# Patient Record
Sex: FEMALE | Race: BLACK OR AFRICAN AMERICAN | Employment: UNEMPLOYED | URBAN - METROPOLITAN AREA
[De-identification: names, ages, dates, MRNs, and addresses within clinical notes are randomized per-mention and may not be internally consistent; named-entity substitution may affect disease eponyms.]

---
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
## 2019-07-15 ENCOUNTER — OFFICE VISIT (OUTPATIENT)
Dept: PRIMARY CARE CLINIC | Age: 19
End: 2019-07-15
Payer: COMMERCIAL

## 2019-07-15 VITALS
DIASTOLIC BLOOD PRESSURE: 84 MMHG | WEIGHT: 184.1 LBS | TEMPERATURE: 98.1 F | OXYGEN SATURATION: 98 % | HEART RATE: 74 BPM | BODY MASS INDEX: 32.62 KG/M2 | SYSTOLIC BLOOD PRESSURE: 128 MMHG | HEIGHT: 63 IN | RESPIRATION RATE: 12 BRPM

## 2019-07-15 DIAGNOSIS — H04.203 WATERY EYES: ICD-10-CM

## 2019-07-15 DIAGNOSIS — Z00.00 PREVENTATIVE HEALTH CARE: Primary | ICD-10-CM

## 2019-07-15 DIAGNOSIS — Z23 NEED FOR PROPHYLACTIC VACCINATION AGAINST STREPTOCOCCUS PNEUMONIAE (PNEUMOCOCCUS): ICD-10-CM

## 2019-07-15 DIAGNOSIS — K21.9 GASTROESOPHAGEAL REFLUX DISEASE WITHOUT ESOPHAGITIS: ICD-10-CM

## 2019-07-15 DIAGNOSIS — F32.A DEPRESSION, UNSPECIFIED DEPRESSION TYPE: ICD-10-CM

## 2019-07-15 DIAGNOSIS — L65.9 HAIR LOSS: ICD-10-CM

## 2019-07-15 DIAGNOSIS — Z11.3 SCREENING EXAMINATION FOR STD (SEXUALLY TRANSMITTED DISEASE): ICD-10-CM

## 2019-07-15 DIAGNOSIS — Z72.0 TOBACCO ABUSE: ICD-10-CM

## 2019-07-15 DIAGNOSIS — Z30.46 NEXPLANON REMOVAL: ICD-10-CM

## 2019-07-15 DIAGNOSIS — H61.23 BILATERAL IMPACTED CERUMEN: ICD-10-CM

## 2019-07-15 DIAGNOSIS — J45.20 MILD INTERMITTENT ASTHMA WITHOUT COMPLICATION: ICD-10-CM

## 2019-07-15 PROCEDURE — 90471 IMMUNIZATION ADMIN: CPT | Performed by: INTERNAL MEDICINE

## 2019-07-15 PROCEDURE — 99385 PREV VISIT NEW AGE 18-39: CPT | Performed by: INTERNAL MEDICINE

## 2019-07-15 PROCEDURE — 90732 PPSV23 VACC 2 YRS+ SUBQ/IM: CPT | Performed by: INTERNAL MEDICINE

## 2019-07-15 RX ORDER — HYDROXYZINE PAMOATE 50 MG/1
CAPSULE ORAL
COMMUNITY
Start: 2019-05-15 | End: 2019-07-15 | Stop reason: SDUPTHER

## 2019-07-15 RX ORDER — POLYETHYLENE GLYCOL 3350 17 G/17G
17 POWDER, FOR SOLUTION ORAL DAILY
Qty: 578 G | Refills: 1 | Status: SHIPPED | OUTPATIENT
Start: 2019-07-15 | End: 2021-01-05

## 2019-07-15 RX ORDER — TRAZODONE HYDROCHLORIDE 50 MG/1
TABLET ORAL
Qty: 30 TABLET | Refills: 3 | Status: SHIPPED | OUTPATIENT
Start: 2019-07-15 | End: 2021-01-05

## 2019-07-15 RX ORDER — ALBUTEROL SULFATE 90 UG/1
2 AEROSOL, METERED RESPIRATORY (INHALATION) EVERY 6 HOURS PRN
Qty: 1 INHALER | Refills: 3 | Status: SHIPPED | OUTPATIENT
Start: 2019-07-15 | End: 2019-10-29 | Stop reason: SDUPTHER

## 2019-07-15 RX ORDER — CETIRIZINE HYDROCHLORIDE 10 MG/1
10 TABLET ORAL DAILY
Qty: 30 TABLET | Refills: 3 | Status: SHIPPED | OUTPATIENT
Start: 2019-07-15 | End: 2019-11-14 | Stop reason: SDUPTHER

## 2019-07-15 RX ORDER — HYDROXYZINE PAMOATE 50 MG/1
CAPSULE ORAL
Qty: 90 CAPSULE | Refills: 1 | Status: SHIPPED | OUTPATIENT
Start: 2019-07-15 | End: 2019-09-10 | Stop reason: SDUPTHER

## 2019-07-15 RX ORDER — TRAZODONE HYDROCHLORIDE 50 MG/1
TABLET ORAL
COMMUNITY
Start: 2018-07-06 | End: 2019-07-15 | Stop reason: SDUPTHER

## 2019-07-15 RX ORDER — MELATONIN
1000 DAILY
Qty: 30 TABLET | Refills: 5 | Status: SHIPPED | OUTPATIENT
Start: 2019-07-15 | End: 2021-01-05

## 2019-07-15 RX ORDER — ALBUTEROL SULFATE 90 UG/1
AEROSOL, METERED RESPIRATORY (INHALATION)
COMMUNITY
End: 2019-07-15 | Stop reason: SDUPTHER

## 2019-07-15 RX ORDER — LANOLIN ALCOHOL/MO/W.PET/CERES
1 CREAM (GRAM) TOPICAL DAILY
Qty: 30 TABLET | Refills: 3 | Status: SHIPPED | OUTPATIENT
Start: 2019-07-15 | End: 2021-01-05

## 2019-07-15 RX ORDER — RANITIDINE 150 MG/1
150 TABLET ORAL 2 TIMES DAILY PRN
Qty: 60 TABLET | Refills: 5 | Status: SHIPPED | OUTPATIENT
Start: 2019-07-15 | End: 2021-01-05

## 2019-07-15 ASSESSMENT — PATIENT HEALTH QUESTIONNAIRE - PHQ9
SUM OF ALL RESPONSES TO PHQ QUESTIONS 1-9: 2
2. FEELING DOWN, DEPRESSED OR HOPELESS: 1
SUM OF ALL RESPONSES TO PHQ QUESTIONS 1-9: 2
1. LITTLE INTEREST OR PLEASURE IN DOING THINGS: 1
SUM OF ALL RESPONSES TO PHQ9 QUESTIONS 1 & 2: 2

## 2019-07-15 ASSESSMENT — ENCOUNTER SYMPTOMS
VOMITING: 0
NAUSEA: 0
CONSTIPATION: 0
COUGH: 0
EYE DISCHARGE: 1
SHORTNESS OF BREATH: 0
ABDOMINAL PAIN: 0
ROS SKIN COMMENTS: +HAIR LOSS
BACK PAIN: 0
DIARRHEA: 0

## 2019-07-15 NOTE — PROGRESS NOTES
edges of scalp anteriorly, posteriorly, and laterally. No rash in scalp   Psychiatric: She has a normal mood and affect. Her behavior is normal.   Vitals reviewed. Assessment:  Francine Davey is a 20 y.o. female,  with a history of asthma, PTSD, depression, GERD complicated by ulcer , who presents for a new patient visit. Plan:       1. Preventative health care    - CBC Auto Differential; Future  - Hemoglobin A1C; Future  - Hepatic Function Panel; Future  - HIV Screen; Future  - Lipid Panel; Future  - Renal Function Panel; Future  - TSH with Reflex; Future    2. Hair loss  - TSH with Reflex; Future  - [address] Rik De Anda DO, Dermatology, [place]  - Biotin 300 MCG TABS; Take 1 tablet by mouth daily  Dispense: 30 tablet; Refill: 3    3. Tobacco abuse  -recommended smoking cessation, discussed long term consequences of tobacco abuse   - nicotine polacrilex (NICORETTE) 2 MG gum; Take 1 each by mouth 3 times daily as needed for Smoking cessation  Dispense: 110 each; Refill: 3    4. Gastroesophageal reflux disease without esophagitis    - ranitidine (ZANTAC) 150 MG tablet; Take 1 tablet by mouth 2 times daily as needed for Heartburn  Dispense: 60 tablet; Refill: 5    5. Nexplanon removal    - [facility] [place], MD, Gynecology, [address] [facility]    6. Mild intermittent asthma without complication    - albuterol sulfate HFA (PROAIR HFA) 108 (90 Base) MCG/ACT inhaler; Inhale 2 puffs into the lungs every 6 hours as needed for Wheezing or Shortness of Breath  Dispense: 1 Inhaler; Refill: 3    7. Bilateral impacted cerumen  -performed ear irrigation in clinic today   - Niecy Cervantes DO, Otolaryngology, [place]  - carbamide peroxide [facility]) 6.5 % otic solution; Place 5 drops in ear(s) 2 times daily  Dispense: 15 mL; Refill: 2    8. Depression, unspecified depression type    - External Referral To Psychology    9.  Screening examination for STD (sexually transmitted disease)    - HIV Screen; Future  - RPR Reflex; Future  - C.trachomatis N.gonorrhoeae DNA, Urine; Future  - Trichomonas Screen  - HERPES SIMPLEX VIRUS (HSV) I/II ANTIBODIES IGG & IGM W/ REFLEX; Future    10. Watery eyes    - cetirizine (ZYRTEC) 10 MG tablet; Take 1 tablet by mouth daily  Dispense: 30 tablet; Refill: 3    11. Need for prophylactic vaccination against Streptococcus pneumoniae (pneumococcus)    - completed at today's visit: PNEUMOVAX 23 subcutaneous/IM (Pneumococcal polysaccharide vaccine 23-valent >= 3yo)        Return in about 6 months (around 1/15/2020) for general follow up asthma .

## 2019-07-18 DIAGNOSIS — Z11.3 SCREENING EXAMINATION FOR STD (SEXUALLY TRANSMITTED DISEASE): ICD-10-CM

## 2019-07-18 DIAGNOSIS — Z00.00 PREVENTATIVE HEALTH CARE: ICD-10-CM

## 2019-07-18 LAB
ALBUMIN SERPL-MCNC: 4.6 G/DL (ref 3.4–5)
ALP BLD-CCNC: 59 U/L (ref 40–129)
ALT SERPL-CCNC: 15 U/L (ref 10–40)
ANION GAP SERPL CALCULATED.3IONS-SCNC: 14 MMOL/L (ref 3–16)
AST SERPL-CCNC: 17 U/L (ref 15–37)
BASOPHILS ABSOLUTE: 0.1 K/UL (ref 0–0.2)
BASOPHILS RELATIVE PERCENT: 1.2 %
BILIRUB SERPL-MCNC: <0.2 MG/DL (ref 0–1)
BILIRUBIN DIRECT: <0.2 MG/DL (ref 0–0.3)
BILIRUBIN, INDIRECT: NORMAL MG/DL (ref 0–1)
BUN BLDV-MCNC: 15 MG/DL (ref 7–20)
CALCIUM SERPL-MCNC: 9.7 MG/DL (ref 8.3–10.6)
CHLORIDE BLD-SCNC: 106 MMOL/L (ref 99–110)
CHOLESTEROL, TOTAL: 208 MG/DL (ref 0–199)
CO2: 21 MMOL/L (ref 21–32)
CREAT SERPL-MCNC: 0.7 MG/DL (ref 0.6–1.1)
EOSINOPHILS ABSOLUTE: 0.2 K/UL (ref 0–0.6)
EOSINOPHILS RELATIVE PERCENT: 3.4 %
GFR AFRICAN AMERICAN: >60
GFR NON-AFRICAN AMERICAN: >60
GLUCOSE BLD-MCNC: 89 MG/DL (ref 70–99)
HCT VFR BLD CALC: 43.2 % (ref 36–48)
HDLC SERPL-MCNC: 43 MG/DL (ref 40–60)
HEMOGLOBIN: 14.2 G/DL (ref 12–16)
LDL CHOLESTEROL CALCULATED: 151 MG/DL
LYMPHOCYTES ABSOLUTE: 4.1 K/UL (ref 1–5.1)
LYMPHOCYTES RELATIVE PERCENT: 56.2 %
MCH RBC QN AUTO: 28.5 PG (ref 26–34)
MCHC RBC AUTO-ENTMCNC: 33 G/DL (ref 31–36)
MCV RBC AUTO: 86.4 FL (ref 80–100)
MONOCYTES ABSOLUTE: 0.6 K/UL (ref 0–1.3)
MONOCYTES RELATIVE PERCENT: 8 %
NEUTROPHILS ABSOLUTE: 2.2 K/UL (ref 1.7–7.7)
NEUTROPHILS RELATIVE PERCENT: 31.2 %
PDW BLD-RTO: 14.9 % (ref 12.4–15.4)
PHOSPHORUS: 3.1 MG/DL (ref 2.5–4.9)
PLATELET # BLD: 210 K/UL (ref 135–450)
PMV BLD AUTO: 9 FL (ref 5–10.5)
POTASSIUM SERPL-SCNC: 4.3 MMOL/L (ref 3.5–5.1)
RBC # BLD: 5 M/UL (ref 4–5.2)
SODIUM BLD-SCNC: 141 MMOL/L (ref 136–145)
TOTAL PROTEIN: 7.4 G/DL (ref 6.4–8.2)
TRIGL SERPL-MCNC: 70 MG/DL (ref 0–150)
TSH REFLEX: 2.37 UIU/ML (ref 0.27–4.2)
VLDLC SERPL CALC-MCNC: 14 MG/DL
WBC # BLD: 7.2 K/UL (ref 4–11)

## 2019-07-19 LAB
C. TRACHOMATIS DNA ,URINE: NEGATIVE
ESTIMATED AVERAGE GLUCOSE: 116.9 MG/DL
HBA1C MFR BLD: 5.7 %
HIV AG/AB: NORMAL
HIV ANTIGEN: NORMAL
HIV-1 ANTIBODY: NORMAL
HIV-2 AB: NORMAL
N. GONORRHOEAE DNA, URINE: NEGATIVE
TOTAL SYPHILLIS IGG/IGM: NORMAL

## 2019-07-22 LAB
HERPES TYPE 1/2 IGM COMBINED: 0.46 IV
HERPES TYPE I/II IGG COMBINED: 0.2 IV

## 2019-07-23 ENCOUNTER — TELEPHONE (OUTPATIENT)
Dept: PRIMARY CARE CLINIC | Age: 19
End: 2019-07-23

## 2019-09-10 RX ORDER — HYDROXYZINE PAMOATE 50 MG/1
CAPSULE ORAL
Qty: 90 CAPSULE | Refills: 1 | Status: SHIPPED | OUTPATIENT
Start: 2019-09-10 | End: 2021-01-05

## 2019-10-07 ENCOUNTER — TELEPHONE (OUTPATIENT)
Dept: PRIMARY CARE CLINIC | Age: 19
End: 2019-10-07

## 2019-10-07 DIAGNOSIS — Z11.3 SCREENING EXAMINATION FOR STD (SEXUALLY TRANSMITTED DISEASE): Primary | ICD-10-CM

## 2019-10-22 DIAGNOSIS — Z11.3 SCREENING EXAMINATION FOR STD (SEXUALLY TRANSMITTED DISEASE): ICD-10-CM

## 2019-10-23 LAB
HIV AG/AB: NORMAL
HIV ANTIGEN: NORMAL
HIV-1 ANTIBODY: NORMAL
HIV-2 AB: NORMAL
TOTAL SYPHILLIS IGG/IGM: NORMAL

## 2019-10-24 LAB
APTIMA MEDIA TYPE: NORMAL
C. TRACHOMATIS DNA ,URINE: NEGATIVE
N. GONORRHOEAE DNA, URINE: NEGATIVE
SPECIMEN SOURCE: NORMAL
T. VAGINALIS AMPLIFIED: NEGATIVE

## 2019-10-26 LAB
HERPES TYPE 1/2 IGM COMBINED: 0.77 IV
HERPES TYPE I/II IGG COMBINED: 0.19 IV

## 2019-10-28 ENCOUNTER — TELEPHONE (OUTPATIENT)
Dept: PRIMARY CARE CLINIC | Age: 19
End: 2019-10-28

## 2019-10-29 DIAGNOSIS — J45.20 MILD INTERMITTENT ASTHMA WITHOUT COMPLICATION: ICD-10-CM

## 2019-10-29 RX ORDER — ALBUTEROL SULFATE 90 UG/1
2 AEROSOL, METERED RESPIRATORY (INHALATION) EVERY 6 HOURS PRN
Qty: 8.5 INHALER | Refills: 3 | Status: SHIPPED | OUTPATIENT
Start: 2019-10-29 | End: 2019-11-18

## 2019-11-14 DIAGNOSIS — H04.203 WATERY EYES: ICD-10-CM

## 2019-11-14 RX ORDER — CETIRIZINE HYDROCHLORIDE 10 MG/1
TABLET ORAL
Qty: 30 TABLET | Refills: 3 | Status: SHIPPED | OUTPATIENT
Start: 2019-11-14 | End: 2021-01-05

## 2019-11-18 ENCOUNTER — OFFICE VISIT (OUTPATIENT)
Dept: PRIMARY CARE CLINIC | Age: 19
End: 2019-11-18
Payer: COMMERCIAL

## 2019-11-18 VITALS
BODY MASS INDEX: 31.71 KG/M2 | WEIGHT: 179 LBS | HEART RATE: 85 BPM | TEMPERATURE: 98 F | SYSTOLIC BLOOD PRESSURE: 134 MMHG | DIASTOLIC BLOOD PRESSURE: 80 MMHG | OXYGEN SATURATION: 98 % | RESPIRATION RATE: 18 BRPM

## 2019-11-18 DIAGNOSIS — Z30.46 NEXPLANON REMOVAL: ICD-10-CM

## 2019-11-18 DIAGNOSIS — Z23 NEED FOR HPV VACCINATION: ICD-10-CM

## 2019-11-18 DIAGNOSIS — N91.1 SECONDARY AMENORRHEA: ICD-10-CM

## 2019-11-18 DIAGNOSIS — N91.1 SECONDARY AMENORRHEA: Primary | ICD-10-CM

## 2019-11-18 DIAGNOSIS — K59.04 CHRONIC IDIOPATHIC CONSTIPATION: ICD-10-CM

## 2019-11-18 LAB
FOLLICLE STIMULATING HORMONE: 5.7 MIU/ML
GONADOTROPIN, CHORIONIC (HCG) QUANT: <5 MIU/ML
LUTEINIZING HORMONE: 12.7 MIU/ML
PROLACTIN: 8 NG/ML

## 2019-11-18 PROCEDURE — 99213 OFFICE O/P EST LOW 20 MIN: CPT | Performed by: INTERNAL MEDICINE

## 2019-11-18 PROCEDURE — 4004F PT TOBACCO SCREEN RCVD TLK: CPT | Performed by: INTERNAL MEDICINE

## 2019-11-18 PROCEDURE — G8427 DOCREV CUR MEDS BY ELIG CLIN: HCPCS | Performed by: INTERNAL MEDICINE

## 2019-11-18 PROCEDURE — G8417 CALC BMI ABV UP PARAM F/U: HCPCS | Performed by: INTERNAL MEDICINE

## 2019-11-18 PROCEDURE — G8484 FLU IMMUNIZE NO ADMIN: HCPCS | Performed by: INTERNAL MEDICINE

## 2019-11-18 RX ORDER — AMOXICILLIN 250 MG
2 CAPSULE ORAL DAILY PRN
Qty: 60 TABLET | Refills: 3 | Status: SHIPPED | OUTPATIENT
Start: 2019-11-18 | End: 2021-01-05

## 2019-11-18 ASSESSMENT — ENCOUNTER SYMPTOMS
VOMITING: 1
COUGH: 0
CONSTIPATION: 1
ABDOMINAL PAIN: 1
NAUSEA: 1
SHORTNESS OF BREATH: 0

## 2019-11-19 ENCOUNTER — TELEPHONE (OUTPATIENT)
Dept: PRIMARY CARE CLINIC | Age: 19
End: 2019-11-19

## 2019-11-19 DIAGNOSIS — Z30.46 NEXPLANON REMOVAL: Primary | ICD-10-CM

## 2020-07-24 ENCOUNTER — APPOINTMENT (OUTPATIENT)
Dept: CT IMAGING | Age: 20
End: 2020-07-24
Payer: COMMERCIAL

## 2020-07-24 ENCOUNTER — HOSPITAL ENCOUNTER (EMERGENCY)
Age: 20
Discharge: HOME | End: 2020-07-24
Attending: STUDENT IN AN ORGANIZED HEALTH CARE EDUCATION/TRAINING PROGRAM
Payer: COMMERCIAL

## 2020-07-24 VITALS
TEMPERATURE: 98.1 F | OXYGEN SATURATION: 100 % | HEART RATE: 61 BPM | SYSTOLIC BLOOD PRESSURE: 143 MMHG | DIASTOLIC BLOOD PRESSURE: 87 MMHG | RESPIRATION RATE: 16 BRPM

## 2020-07-24 LAB
A/G RATIO: 1.3 (ref 1.1–2.2)
ALBUMIN SERPL-MCNC: 4.2 G/DL (ref 3.4–5)
ALP BLD-CCNC: 60 U/L (ref 40–129)
ALT SERPL-CCNC: 12 U/L (ref 10–40)
ANION GAP SERPL CALCULATED.3IONS-SCNC: 12 MMOL/L (ref 3–16)
AST SERPL-CCNC: 16 U/L (ref 15–37)
BACTERIA: ABNORMAL /HPF
BASOPHILS ABSOLUTE: 0.1 K/UL (ref 0–0.2)
BASOPHILS RELATIVE PERCENT: 1.4 %
BILIRUB SERPL-MCNC: 0.3 MG/DL (ref 0–1)
BILIRUBIN URINE: NEGATIVE
BLOOD, URINE: ABNORMAL
BUN BLDV-MCNC: 13 MG/DL (ref 7–20)
CALCIUM SERPL-MCNC: 9.8 MG/DL (ref 8.3–10.6)
CHLORIDE BLD-SCNC: 106 MMOL/L (ref 99–110)
CLARITY: CLEAR
CO2: 20 MMOL/L (ref 21–32)
COLOR: YELLOW
CREAT SERPL-MCNC: <0.5 MG/DL (ref 0.6–1.1)
EOSINOPHILS ABSOLUTE: 0.1 K/UL (ref 0–0.6)
EOSINOPHILS RELATIVE PERCENT: 1.4 %
EPITHELIAL CELLS, UA: 2 /HPF (ref 0–5)
GFR AFRICAN AMERICAN: >60
GFR NON-AFRICAN AMERICAN: >60
GLOBULIN: 3.2 G/DL
GLUCOSE BLD-MCNC: 96 MG/DL (ref 70–99)
GLUCOSE URINE: NEGATIVE MG/DL
HCG QUALITATIVE: NEGATIVE
HCT VFR BLD CALC: 41.5 % (ref 36–48)
HEMOGLOBIN: 14 G/DL (ref 12–16)
HYALINE CASTS: 1 /LPF (ref 0–8)
KETONES, URINE: NEGATIVE MG/DL
LEUKOCYTE ESTERASE, URINE: ABNORMAL
LIPASE: 30 U/L (ref 13–60)
LYMPHOCYTES ABSOLUTE: 4.2 K/UL (ref 1–5.1)
LYMPHOCYTES RELATIVE PERCENT: 54.7 %
MCH RBC QN AUTO: 28.4 PG (ref 26–34)
MCHC RBC AUTO-ENTMCNC: 33.7 G/DL (ref 31–36)
MCV RBC AUTO: 84.3 FL (ref 80–100)
MICROSCOPIC EXAMINATION: YES
MONOCYTES ABSOLUTE: 0.4 K/UL (ref 0–1.3)
MONOCYTES RELATIVE PERCENT: 5.8 %
NEUTROPHILS ABSOLUTE: 2.8 K/UL (ref 1.7–7.7)
NEUTROPHILS RELATIVE PERCENT: 36.7 %
NITRITE, URINE: NEGATIVE
PDW BLD-RTO: 13.8 % (ref 12.4–15.4)
PH UA: 6 (ref 5–8)
PLATELET # BLD: 234 K/UL (ref 135–450)
PMV BLD AUTO: 7.9 FL (ref 5–10.5)
POTASSIUM REFLEX MAGNESIUM: 3.9 MMOL/L (ref 3.5–5.1)
PROTEIN UA: NEGATIVE MG/DL
RBC # BLD: 4.93 M/UL (ref 4–5.2)
RBC UA: 12 /HPF (ref 0–4)
SODIUM BLD-SCNC: 138 MMOL/L (ref 136–145)
SPECIFIC GRAVITY UA: 1.02 (ref 1–1.03)
TOTAL PROTEIN: 7.4 G/DL (ref 6.4–8.2)
URINE REFLEX TO CULTURE: ABNORMAL
URINE TYPE: ABNORMAL
UROBILINOGEN, URINE: 0.2 E.U./DL
WBC # BLD: 7.6 K/UL (ref 4–11)
WBC UA: 5 /HPF (ref 0–5)

## 2020-07-24 PROCEDURE — 85025 COMPLETE CBC W/AUTO DIFF WBC: CPT

## 2020-07-24 PROCEDURE — 83690 ASSAY OF LIPASE: CPT

## 2020-07-24 PROCEDURE — 36415 COLL VENOUS BLD VENIPUNCTURE: CPT

## 2020-07-24 PROCEDURE — 80053 COMPREHEN METABOLIC PANEL: CPT

## 2020-07-24 PROCEDURE — 99284 EMERGENCY DEPT VISIT MOD MDM: CPT

## 2020-07-24 PROCEDURE — 81001 URINALYSIS AUTO W/SCOPE: CPT

## 2020-07-24 PROCEDURE — 84703 CHORIONIC GONADOTROPIN ASSAY: CPT

## 2020-07-24 PROCEDURE — 74176 CT ABD & PELVIS W/O CONTRAST: CPT

## 2020-07-24 ASSESSMENT — PAIN SCALES - GENERAL
PAINLEVEL_OUTOF10: 4
PAINLEVEL_OUTOF10: 0

## 2020-07-24 ASSESSMENT — PAIN DESCRIPTION - LOCATION: LOCATION: ABDOMEN

## 2020-07-24 ASSESSMENT — PAIN DESCRIPTION - ORIENTATION: ORIENTATION: RIGHT;LOWER

## 2020-07-24 ASSESSMENT — PAIN - FUNCTIONAL ASSESSMENT: PAIN_FUNCTIONAL_ASSESSMENT: PREVENTS OR INTERFERES SOME ACTIVE ACTIVITIES AND ADLS

## 2020-07-24 ASSESSMENT — PAIN SCALES - WONG BAKER: WONGBAKER_NUMERICALRESPONSE: 4

## 2020-07-24 ASSESSMENT — PAIN DESCRIPTION - DESCRIPTORS: DESCRIPTORS: ACHING;CRAMPING;STABBING

## 2020-07-24 ASSESSMENT — PAIN DESCRIPTION - PROGRESSION: CLINICAL_PROGRESSION: GRADUALLY WORSENING

## 2020-07-24 ASSESSMENT — PAIN DESCRIPTION - PAIN TYPE: TYPE: ACUTE PAIN

## 2020-07-24 ASSESSMENT — PAIN DESCRIPTION - FREQUENCY: FREQUENCY: INTERMITTENT

## 2020-07-24 ASSESSMENT — PAIN DESCRIPTION - ONSET: ONSET: SUDDEN

## 2020-07-25 NOTE — ED PROVIDER NOTES
Primary Care Physician: No primary care provider on file. Attending Physician: No att. providers found     History   Chief Complaint   Patient presents with    Abdominal Pain     RLQ        HPI   Aunvikram Lee is a 23 y.o. female history of appendicitis status post appendectomy presenting this afternoon complaining of right lower quadrant pain that radiates to the back is been going on for a few days. She states or describes her pain as sharp and has never had pain like this before. States that she has not had a period for the past few years. She is concerned because relative had similar symptoms and was diagnosed with cancer. Patient denies fevers, chills, nausea, vomiting or other symptoms associated with systemic infection. Denies any exposures to persons infected with coronavirus.     Past Medical History:   Diagnosis Date    Asthma         Past Surgical History:   Procedure Laterality Date    LAPAROSCOPIC APPENDECTOMY  06/25/2017    APPENDECTOMY LAPAROSCOPIC       TONSILLECTOMY          Family History   Problem Relation Age of Onset    Bipolar Disorder Father     Schizophrenia Father     Hypertension Paternal Grandmother     Diabetes Paternal Grandmother     Anxiety Disorder Paternal Grandmother     Arrhythmia Paternal Grandmother     Bipolar Disorder Brother     Ovarian Cancer Maternal Cousin         Social History     Socioeconomic History    Marital status: Single     Spouse name: None    Number of children: None    Years of education: None    Highest education level: None   Occupational History    None   Social Needs    Financial resource strain: None    Food insecurity     Worry: None     Inability: None    Transportation needs     Medical: None     Non-medical: None   Tobacco Use    Smoking status: Current Some Day Smoker     Packs/day: 0.50     Types: Cigarettes    Smokeless tobacco: Never Used    Tobacco comment: occasionally   Substance and Sexual Activity    Alcohol use: No    Drug use: Yes     Frequency: 2.0 times per week     Types: Marijuana    Sexual activity: None   Lifestyle    Physical activity     Days per week: None     Minutes per session: None    Stress: None   Relationships    Social connections     Talks on phone: None     Gets together: None     Attends Hindu service: None     Active member of club or organization: None     Attends meetings of clubs or organizations: None     Relationship status: None    Intimate partner violence     Fear of current or ex partner: None     Emotionally abused: None     Physically abused: None     Forced sexual activity: None   Other Topics Concern    None   Social History Narrative    None        Review of Systems   10 total systems reviewed and found to be negative unless otherwise noted in HPI     Physical Exam   BP (!) 143/87   Pulse 61   Temp 98.1 °F (36.7 °C) (Oral)   Resp 16   SpO2 100%      CONSTITUTIONAL: Well appearing, in no acute distress   HEAD: atraumatic, normocephalic   EYES: PERRL, No injection, discharge or scleral icterus. ENT: Moist mucous membranes. NECK: Normal ROM, NO LAD   CARDIOVASCULAR: Regular rate and rhythm. No murmurs or gallop. PULMONARY/CHEST: Airway patent. No retractions. Breath sounds clear with good air entry bilaterally. ABDOMEN: Soft, Non-distended and non-tender, without guarding or rebound. SKIN: Acyanotic, warm, dry   MUSCULOSKELETAL: No swelling, tenderness or deformity   NEUROLOGICAL: Awake and oriented x 3. Pulses intact. Grossly nonfocal   Nursing note and vitals reviewed.      ED Course & Medical Decision Making   Medications - No data to display   Labs Reviewed   COMPREHENSIVE METABOLIC PANEL W/ REFLEX TO MG FOR LOW K - Abnormal; Notable for the following components:       Result Value    CO2 20 (*)     CREATININE <0.5 (*)     All other components within normal limits    Narrative:     Performed at:  [facility]  [address] [place] [address] [address], [employer] [address]   Phone [phone]   URINE RT REFLEX TO CULTURE - Abnormal; Notable for the following components:    Blood, Urine SMALL (*)     Leukocyte Esterase, Urine SMALL (*)     All other components within normal limits    Narrative:     Performed at:  [facility]  [address], [facility] [employer] [address]   Phone [phone]   MICROSCOPIC URINALYSIS - Abnormal; Notable for the following components:    Bacteria, UA RARE (*)     RBC, UA 12 (*)     All other components within normal limits    Narrative:     Performed at:  [facility]  [address], [facility] [employer] [address]   Phone [phone]   HCG, SERUM, QUALITATIVE    Narrative:     Performed at:  [facility]  [address], [facility] [employer] [address]   Phone [phone]   CBC WITH AUTO DIFFERENTIAL    Narrative:     Performed at:  [facility]  [address] [place] [address] [facility] [employer] [address]   Phone [phone]   LIPASE    Narrative:     Performed at:  [facility]  [address] [place] [address] [facility] [employer] [address]   Phone [phone]      CT ABDOMEN PELVIS WO CONTRAST Additional Contrast? None   Final Result   No urolithiasis or acute obstructive uropathy. No acute finding in the abdomen or pelvis. Ct Abdomen Pelvis Wo Contrast Additional Contrast? None    Result Date: 7/24/2020  EXAMINATION: STONE PROTOCOL CT OF THE ABDOMEN AND PELVIS 7/24/2020 6:32 pm TECHNIQUE: CT of the abdomen and pelvis was performed without the administration of intravenous contrast. Multiplanar reformatted images are provided for review. Dose modulation, iterative reconstruction, and/or weight based adjustment of the mA/kV was utilized to reduce the radiation dose to as low as reasonably achievable. COMPARISON: None.  06/24/2017 HISTORY: ORDERING SYSTEM PROVIDED HISTORY: Rule out kidney stone TECHNOLOGIST PROVIDED HISTORY: Reason for exam:->Rule out kidney stone Additional Contrast?->None Is the patient pregnant?->No Reason for Exam: flank pain-r/o kidbey stone. hx appendectomy Acuity: Acute Type of Exam: Initial Right lower quadrant pain. FINDINGS: LOWER CHEST:  Visualized portion of the lower chest demonstrates no acute abnormality. KIDNEYS AND URINARY TRACT: No renal calculi are identified. There is no evidence for hydronephrosis. The ureters are of normal course and caliber. ORGANS: Lack of intravenous contrast limits evaluation of the solid organs and bowel. The solid organs are grossly unremarkable. There are no calcified gallstones. No pericholecystic fluid or fat stranding. GI/BOWEL: No bowel obstruction. The appendix is surgically absent. PELVIS: The bladder and pelvic organs are unremarkable. PERITONEUM/RETROPERITONEUM: No adenopathy, free air or free fluid. BONES/SOFT TISSUES: The osseous structures demonstrate no acute abnormality. No urolithiasis or acute obstructive uropathy. No acute finding in the abdomen or pelvis. PROCEDURES:   Procedures    ASSESSMENT AND PLAN:  Henri Suárez is a 23 y.o. female presenting with complaint of abdominal and flank pain. Exam was unremarkable apart from some mild abdominal tenderness. I did obtain labs which showed no significant findings apart from UA with greater than 12 RBCs. Even this findings I was concerned that her symptoms could be secondary to a kidney stone. Did obtain a CT abdomen pelvic Noncon which showed no abnormal findings. At this time the cause of her pain is unknown. Be secondary to a kidney stone that she passed. However symptoms are nonsurgical and she is stable to be discharged home. I have recommended that she follows up with her OB/GYN for her menstrual issues. .  ClINICAL IMPRESSION:  1.  Generalized abdominal pain          PATIENT REFERRED TO:  Janee Rhodes  [phone]          NGUPULZBH MEDICATIONS:  Discharge Medication List as of 7/24/2020  7:52 PM        DISCONTINUED MEDICATIONS:  Discharge Medication List as of 7/24/2020  7:52 PM        DISPOSITION Decision To Discharge 07/24/2020 07:28:01 PM  -We have instructed the patient, Oma Marin) to return to the ED or call her PCP if her pain/symptoms worsen. -Findings and recommendations explained to patient. She expressed understanding and agreed with the plan. Valdez Lyon MD (electronically signed)  7/25/2020  _________________________________________________________________________________________  _________________________________________________________________________________________  This record is transcribed utilizing voice recognition technology. There are inherent limitations in this technology. In addition, there may be limitations in editing of this report. If there are any discrepancies, please contact me directly.        Valdez Lyon MD  07/25/20 [id number]

## 2021-01-05 ENCOUNTER — OFFICE VISIT (OUTPATIENT)
Dept: PRIMARY CARE CLINIC | Age: 21
End: 2021-01-05
Payer: COMMERCIAL

## 2021-01-05 VITALS
BODY MASS INDEX: 34.38 KG/M2 | SYSTOLIC BLOOD PRESSURE: 132 MMHG | WEIGHT: 194 LBS | HEART RATE: 100 BPM | HEIGHT: 63 IN | TEMPERATURE: 98.2 F | DIASTOLIC BLOOD PRESSURE: 88 MMHG

## 2021-01-05 DIAGNOSIS — R35.0 URINARY FREQUENCY: ICD-10-CM

## 2021-01-05 DIAGNOSIS — Z32.00 POSSIBLE PREGNANCY: ICD-10-CM

## 2021-01-05 DIAGNOSIS — Z01.419 WELL WOMAN EXAM WITH ROUTINE GYNECOLOGICAL EXAM: ICD-10-CM

## 2021-01-05 DIAGNOSIS — Z20.2 EXPOSURE TO STD: Primary | ICD-10-CM

## 2021-01-05 DIAGNOSIS — N89.8 VAGINAL DISCHARGE: ICD-10-CM

## 2021-01-05 LAB
BILIRUBIN, POC: NEGATIVE
BLOOD URINE, POC: NEGATIVE
CLARITY, POC: NORMAL
COLOR, POC: NORMAL
CONTROL: NORMAL
GLUCOSE URINE, POC: NEGATIVE
KETONES, POC: NEGATIVE
LEUKOCYTE EST, POC: NORMAL
NITRITE, POC: NEGATIVE
PH, POC: 6.5
PREGNANCY TEST URINE, POC: NEGATIVE
PROTEIN, POC: NEGATIVE
SPECIFIC GRAVITY, POC: 1.03
UROBILINOGEN, POC: 0.2

## 2021-01-05 PROCEDURE — 4004F PT TOBACCO SCREEN RCVD TLK: CPT | Performed by: NURSE PRACTITIONER

## 2021-01-05 PROCEDURE — G8417 CALC BMI ABV UP PARAM F/U: HCPCS | Performed by: NURSE PRACTITIONER

## 2021-01-05 PROCEDURE — 99395 PREV VISIT EST AGE 18-39: CPT | Performed by: NURSE PRACTITIONER

## 2021-01-05 PROCEDURE — G8484 FLU IMMUNIZE NO ADMIN: HCPCS | Performed by: NURSE PRACTITIONER

## 2021-01-05 PROCEDURE — 81002 URINALYSIS NONAUTO W/O SCOPE: CPT | Performed by: NURSE PRACTITIONER

## 2021-01-05 PROCEDURE — G8427 DOCREV CUR MEDS BY ELIG CLIN: HCPCS | Performed by: NURSE PRACTITIONER

## 2021-01-05 PROCEDURE — 81025 URINE PREGNANCY TEST: CPT | Performed by: NURSE PRACTITIONER

## 2021-01-05 PROCEDURE — 99213 OFFICE O/P EST LOW 20 MIN: CPT | Performed by: NURSE PRACTITIONER

## 2021-01-05 PROCEDURE — 96372 THER/PROPH/DIAG INJ SC/IM: CPT | Performed by: NURSE PRACTITIONER

## 2021-01-05 RX ORDER — CEFTRIAXONE 500 MG/1
500 INJECTION, POWDER, FOR SOLUTION INTRAMUSCULAR; INTRAVENOUS ONCE
Status: COMPLETED | OUTPATIENT
Start: 2021-01-05 | End: 2021-01-05

## 2021-01-05 RX ORDER — CEFTRIAXONE SODIUM 250 MG/1
250 INJECTION, POWDER, FOR SOLUTION INTRAMUSCULAR; INTRAVENOUS ONCE
Status: CANCELLED | OUTPATIENT
Start: 2021-01-05 | End: 2021-01-05

## 2021-01-05 RX ORDER — CEFTRIAXONE 500 MG/1
500 INJECTION, POWDER, FOR SOLUTION INTRAMUSCULAR; INTRAVENOUS ONCE
Qty: 500 MG | Refills: 0
Start: 2021-01-05 | End: 2021-01-05

## 2021-01-05 RX ORDER — AZITHROMYCIN 500 MG/1
1000 TABLET, FILM COATED ORAL ONCE
Qty: 2 TABLET | Refills: 0 | Status: SHIPPED | OUTPATIENT
Start: 2021-01-05 | End: 2021-01-05

## 2021-01-05 RX ORDER — CEFTRIAXONE 500 MG/1
500 INJECTION, POWDER, FOR SOLUTION INTRAMUSCULAR; INTRAVENOUS ONCE
Qty: 500 MG | Refills: 0
Start: 2021-01-05 | End: 2021-01-05 | Stop reason: CLARIF

## 2021-01-05 RX ADMIN — CEFTRIAXONE 500 MG: 500 INJECTION, POWDER, FOR SOLUTION INTRAMUSCULAR; INTRAVENOUS at 09:38

## 2021-01-05 ASSESSMENT — ENCOUNTER SYMPTOMS
COUGH: 0
BACK PAIN: 0
SHORTNESS OF BREATH: 0

## 2021-01-05 NOTE — PROGRESS NOTES
Subjective     CC:   Chief Complaint   Patient presents with    Exposure to STD       HPI    Fadi Kingston is a 24 yo female here for exposure to STD. Former patient of Dr Kyler Smith. LMP around end of November or beginning of September. Patient isn't sure of exact date. Reports male partner tested positive for gonorrhea. Patient reports she is having some lower abdominal pressure that began about 2-3 days ago. Felt like menstrual cramping when it started. Reports increased vaginal discharge. Describes discharge as clear. Denies odor. Denies dysuria. Does reports urinary frequency. Has not had pap smear since turning age 23. Denies FH gyn cancer. Denies FH breast cancer, she thinks her biological mother may have had breast cancer. Does not perform BSE. Not using contraception. Had Nexplanon several months ago. Not interested in contraception at this time. Does not use condoms. Review of Systems   Constitutional: Negative for chills and fever. Respiratory: Negative for cough and shortness of breath. Genitourinary: Positive for frequency, pelvic pain and vaginal discharge. Negative for difficulty urinating and dysuria. Musculoskeletal: Negative for back pain. Objective   Vitals:    01/05/21 0824   BP: 132/88   Pulse: 100   Temp: 98.2 °F (36.8 °C)   TempSrc: Temporal   Weight: 194 lb (88 kg)   Height: 5' 3\" (1.6 m)     Body mass index is 34.37 kg/m². Wt Readings from Last 3 Encounters:   01/05/21 194 lb (88 kg)   11/18/19 179 lb (81.2 kg)   07/15/19 184 lb 1.6 oz (83.5 kg)     BP Readings from Last 3 Encounters:   01/05/21 132/88   07/24/20 (!) 143/87   11/18/19 134/80      Physical Exam  Exam conducted with a chaperone present Daryle Pink, MA present ). Constitutional:       General: She is not in acute distress. Appearance: Normal appearance. She is obese. She is not ill-appearing. Eyes:      Pupils: Pupils are equal, round, and reactive to light.    Neck: Musculoskeletal: Normal range of motion. Cardiovascular:      Rate and Rhythm: Normal rate and regular rhythm. Pulses: Normal pulses. Heart sounds: Normal heart sounds. No murmur. Pulmonary:      Effort: Pulmonary effort is normal.      Breath sounds: Normal breath sounds. No wheezing. Chest:      Breasts:         Right: Normal. No inverted nipple, mass, nipple discharge, skin change or tenderness. Left: Normal. No inverted nipple, mass, nipple discharge, skin change or tenderness. Abdominal:      Palpations: Abdomen is soft. Tenderness: There is no abdominal tenderness. Genitourinary:     Pubic Area: No rash. Labia:         Right: No lesion. Left: No lesion. Cervix: Discharge and erythema present. No cervical motion tenderness, friability or lesion. Uterus: Not tender. Adnexa:         Right: No tenderness. Left: No tenderness. Comments: White discharge noted  Musculoskeletal: Normal range of motion. Skin:     General: Skin is warm and dry. Neurological:      Mental Status: She is alert. Diagnosis Orders   1. Exposure to STD  azithromycin (ZITHROMAX) 500 MG tablet    cefTRIAXone (ROCEPHIN) 500 MG injection   2. Vaginal discharge  VAGINAL PATHOGENS PROBE *A    C.trachomatis N.gonorrhoeae DNA, Thin Prep   3. Urinary frequency  POCT Urine with Microscopic   4. Possible pregnancy  POCT urine pregnancy   5. Well woman exam with routine gynecological exam  PAP SMEAR           Plan   1. Exposure to STD: Exposure to gonorrhea reported. Treat accordingly, IM Rocephin injection given in clinic. Advised on safe sex practices, treatment of partners and abstinence for 7 days following treatment. Advised return if symptoms do not resolve. - azithromycin (ZITHROMAX) 500 MG tablet; Take 2 tablets by mouth once for 1 dose  Dispense: 2 tablet;  Refill: 0 - cefTRIAXone (ROCEPHIN) 500 MG injection; Inject 500 mg into the muscle once for 1 dose  Dispense: 500 mg; Refill: 0    2. Vaginal discharge: Treat based on exposure to gonorrhea as above. - VAGINAL PATHOGENS PROBE *A  - C.trachomatis N.gonorrhoeae DNA, Thin Prep; Future    3. Urinary frequency: POC UA with trace LE. Treat for gonorrhea exposure as above, suspect this is cause for her urinary frequency. If symptoms do not resolve she is to return. - POCT Urine with Microscopic    4. Possible pregnancy: UPT negative  - POCT urine pregnancy    5. Well woman exam with routine gynecological exam: Educated on ACOG guidelines for cervical cancer screening. Discussed contraceptive options, patient declines contraception use at this time. Advised on safe sex practices.   - PAP SMEAR    Return in about 1 month (around 2/5/2021) for physical . OR sooner with questions, concerns, worsening symptoms

## 2021-01-05 NOTE — PATIENT INSTRUCTIONS
If you are able, find out what type of cancer your biological mother was treated for and what age she was diagnosed. Please return for physical exam   Abstain from intercourse for 7 days. Partner needs to be treated. If symptoms do not resolve, please return. Patient Education        Learning About Cervical Cancer Screening  What is a cervical cancer screening test?     The cervix is the lower part of the uterus that opens into the vagina. Cervical cancer screening tests check the cells on the cervix for changes that could lead to cancer. Two tests can be used to screen for cervical cancer. They may be used alone or together. A Pap test.   This test looks for changes in the cells of the cervix. Some of these cell changes could lead to cancer. A human papillomavirus (HPV) test.   This test looks for the HPV virus. Some high-risk types of HPV can cause cell changes that could lead to cervical cancer. The HPV test may be used with the Pap test in women ages 27 and older. When should you have a screening test?  If you have a cervix and are at average risk for cervical cancer, your doctor will likely suggest starting screening at age 24. Ages 24 to 34  If you're in this age group, your doctor will likely suggest that you get a Pap test. If your Pap test results are normal, you can wait 3 years to have another test.  Ages 27 to 59  Screening options for this age group may include:  · A Pap test. If your results are normal, you can wait 3 years to have another test.  · An HPV test. If your results are normal, you can wait 5 years to have another test.  · A Pap test and an HPV test. If your results are normal, you can wait 5 years to be tested again. Ages 72 and older  If you are age 72 or older, you may no longer need this screening test. Talk to your doctor. What do the results of cervical cancer screening mean? Your test results may be normal. Or the results may show minor or serious changes to the cells on your cervix. Minor changes may go away on their own, especially if you are younger than 30. You may have an abnormal test because you have an infection of the vagina or cervix or because you have low estrogen levels after menopause that are causing the cells to change. If you have a high-risk type of human papillomavirus (HPV) or cell changes that could turn into cancer, you may need more tests. The next step may be a colposcopy or treatment to remove or destroy the abnormal cells. If you have a Pap test, an HPV test, or both, your doctor will recommend a follow-up plan based on your results and your age. Follow-up care is a key part of your treatment and safety. Be sure to make and go to all appointments, and call your doctor if you are having problems. It's also a good idea to know your test results and keep a list of the medicines you take. Where can you learn more? Go to https://Evolve IP.[email or web address] org and sign in to your [employer] account. Enter P919 in the [employer] box to learn more about \"Learning About Cervical Cancer Screening. \"     If you do not have an account, please click on the \"Sign Up Now\" link. Current as of: April 29, 2020               Content Version: 12.6  © 2363-5413 [employer], [employer]orporated. Care instructions adapted under license by [facility] ([facility]). If you have questions about a medical condition or this instruction, always ask your healthcare professional. [place] [address] any warranty or liability for your use of this information.          Patient Education        Safer Sex: Care Instructions  Your Care Instructions Safer sex is a way to reduce your risk of getting an infection spread through sex. It can also help prevent pregnancy. Most infections that are spread through sex, also called sexually transmitted infections or STIs, can be cured. But some can decrease your chances of getting pregnant if they are not treated early. Others, such as herpes, have no cure. And some, such as HIV, can be deadly. Several products can help you practice safer sex and reduce your chance of STIs. One of the best is a condom. There are condoms for men and for women. The female condom is a tube of soft plastic with a closed end that is placed deep into the vagina. You can use a special rubber sheet (dental dam) for protection during oral sex. Disposable gloves can keep your hands from touching blood, semen, or other body fluids that can carry infections. Remember that birth control methods such as diaphragms, IUDs, foams, and birth control pills do not stop you from getting STIs. Follow-up care is a key part of your treatment and safety. Be sure to make and go to all appointments, and call your doctor if you are having problems. It's also a good idea to know your test results and keep a list of the medicines you take. How can you care for yourself at home? · Think about getting shots to prevent hepatitis A and hepatitis B. These two diseases can be spread through sex. You also can get hepatitis A if you eat infected food. · Use condoms or female condoms each time and every time you have sex. · Learn the right way to use a male condom:  ? Condoms come in several sizes. Make sure you use the right size. A condom that is too small can break easily. A condom that is too big can slip off during sex. Use a new condom each time you have sex. ? Be careful not to poke a hole in the condom when you open the wrapper. ? Squeeze the tip of the condom to keep out air. ? Pull down the loose skin (foreskin) from the head of an uncircumcised penis. © 1040-9095 Healthwise, Incorporated. Care instructions adapted under license by [facility] ([facility]). If you have questions about a medical condition or this instruction, always ask your healthcare professional. [address] any warranty or liability for your use of this information. Patient Education        Gonorrhea: Care Instructions  Your Care Instructions  Gonorrhea is a bacterial infection spread through sexual contact (sexually transmitted infection, or STI). It is found most often in the genital area but it can also infect other areas of the body, such as the rectum or throat. While most people with gonorrhea develop symptoms within a few days after infection, some people have no symptoms. Symptoms of gonorrhea include abnormal bleeding, pain or burning during urination, or a thick discharge from the vagina or penis. Antibiotics can cure gonorrhea. Both sex partners need to be treated to keep from passing the infection back and forth. Follow-up care is a key part of your treatment and safety. Be sure to make and go to all appointments, and call your doctor if you are having problems. It's also a good idea to know your test results and keep a list of the medicines you take. How can you care for yourself at home? · Your doctor probably gave you a shot of antibiotics. If your doctor prescribed antibiotic pills, take them as directed. Do not stop taking them just because you feel better. You need to take the full course of antibiotics. · Do not have sexual contact with anyone while you are being treated. If your treatment was a single dose of antibiotics, wait at least 7 days after taking the dose before you have any sexual contact. Even if you use a condom, you may pass the infection back and forth. · Wash your hands if you touch an area of gonorrhea infection. This will help prevent spreading the infection to other parts of your body or to other people. · Tell your sex partner or partners that you have gonorrhea. They should get treated, whether or not they have symptoms of infection. · Talk to your doctor about being tested again for gonorrhea in 3 months. To prevent gonorrhea in the future  · Use latex condoms every time you have sex. Use them from the beginning to the end of sexual contact. · Talk to your partner before you have sex. Find out if he or she has or is at risk for gonorrhea or any other STI. Keep in mind that a person may be able to spread an STI even if he or she does not have symptoms. · Do not have sex if you are being treated for gonorrhea or any other STI. · Do not have sex with anyone who has symptoms of an STI, such as sores on the genitals or mouth. · Having one sex partner (who does not have STIs and does not have sex with anyone else) is a good way to avoid STIs. When should you call for help? Call 911 anytime you think you may need emergency care. For example, call if:    · You have sudden, severe pain in your belly or pelvis. Call your doctor now or seek immediate medical care if:    · You have new belly or pelvic pain.     · You have unusual vaginal bleeding.     · You have a fever.     · You have a discharge from the vagina or penis.     · You have new or increased burning or pain with urination, or you cannot urinate.     · You have pain, swelling, or tenderness in the scrotum.     · You have joint pain.     · You have pus coming from your eyes. Watch closely for changes in your health, and be sure to contact your doctor if:    · You think you may have been exposed to another STI.     · Your symptoms get worse or have not improved within 1 week after starting treatment.     · You have any new symptoms, such as sores, bumps, rashes, blisters, or warts in the genital or anal area.     · You have a new skin rash. Where can you learn more? Go to https://chpepiceweb.health-[email or web address] org and sign in to your MetaLogicshart account. Enter W514 in the [facility] box to learn more about \"Gonorrhea: Care Instructions. \"     If you do not have an account, please click on the \"Sign Up Now\" link. Current as of: February 26, 2020               Content Version: 12.6  © 0991-8322 [employer], [facility]. Care instructions adapted under license by [facility] ([facility]). If you have questions about a medical condition or this instruction, always ask your healthcare professional. [address] any warranty or liability for your use of this information.

## 2021-01-06 DIAGNOSIS — B96.89 BACTERIAL VAGINOSIS: Primary | ICD-10-CM

## 2021-01-06 DIAGNOSIS — N76.0 BACTERIAL VAGINOSIS: Primary | ICD-10-CM

## 2021-01-06 LAB
CANDIDA SPECIES, DNA PROBE: ABNORMAL
GARDNERELLA VAGINALIS, DNA PROBE: ABNORMAL
TRICHOMONAS VAGINALIS DNA: ABNORMAL

## 2021-01-06 RX ORDER — METRONIDAZOLE 500 MG/1
500 TABLET ORAL 2 TIMES DAILY
Qty: 14 TABLET | Refills: 0 | Status: SHIPPED | OUTPATIENT
Start: 2021-01-06 | End: 2021-01-13

## 2021-01-12 LAB
C. TRACHOMATIS DNA,THIN PREP: NEGATIVE
N. GONORRHOEAE DNA, THIN PREP: POSITIVE

## 2021-01-19 DIAGNOSIS — B97.7 HPV IN FEMALE: ICD-10-CM

## 2021-01-19 DIAGNOSIS — R87.610 ATYPICAL SQUAMOUS CELL OF UNDETERMINED SIGNIFICANCE OF CERVIX: Primary | ICD-10-CM

## 2021-01-19 LAB
HPV COMMENT: ABNORMAL
HPV TYPE 16: NOT DETECTED
HPV TYPE 18: NOT DETECTED
HPVOH (OTHER TYPES): DETECTED

## 2021-02-04 NOTE — PATIENT INSTRUCTIONS
Have labs drawn  Schedule with gynecology     Patient Education        Prediabetes: Care Instructions  Overview     Prediabetes is a warning sign that you're at risk for getting type 2 diabetes. It means that your blood sugar is higher than it should be. But it's not high enough to be diabetes. The food you eat naturally turns into sugar. Your body uses the sugar for energy. Normally, an organ called the pancreas makes insulin. And insulin allows the sugar in your blood to get into your body's cells. But sometimes the body can't use insulin the right way. So the sugar stays in your blood instead. This is called insulin resistance. The buildup of sugar in your blood means you have prediabetes. The good news is that you may be able to prevent or delay diabetes. Making small lifestyle changes, like getting active and changing your eating habits, may help you get your blood sugar back to normal. You can work with your doctor to make a treatment plan. Follow-up care is a key part of your treatment and safety. Be sure to make and go to all appointments, and call your doctor if you are having problems. It's also a good idea to know your test results and keep a list of the medicines you take. How can you care for yourself at home? · Watch your weight. A healthy weight helps your body use insulin properly. · Limit the amount of calories, sweets, and unhealthy fat you eat. Ask your doctor if you should see a dietitian. A registered dietitian can help you create meal plans that fit your lifestyle. · Get at least 30 minutes of exercise on most days of the week. Exercise helps control your blood sugar. It also helps you maintain a healthy weight. Walking is a good choice. You also may want to do other activities, such as running, swimming, cycling, or playing tennis or team sports. · Do not smoke. Smoking can make prediabetes worse. If you need help quitting, talk to your doctor about stop-smoking programs and medicines. These can increase your chances of quitting for good. · If your doctor prescribed medicines, take them exactly as prescribed. Call your doctor if you think you are having a problem with your medicine. You will get more details on the specific medicines your doctor prescribes. When should you call for help? Watch closely for changes in your health, and be sure to contact your doctor if:    · You have any symptoms of diabetes. These may include:  ? Being thirsty more often. ? Urinating more. ? Being hungrier. ? Losing weight. ? Being very tired. ? Having blurry vision.     · You have a wound that will not heal.     · You have an infection that will not go away.     · You have problems with your blood pressure.     · You want more information about diabetes and how you can keep from getting it. Where can you learn more? Go to https://Wild [email or web address].[email or web address] org and sign in to your [employer] account. Enter I222 in the [employer] box to learn more about \"Prediabetes: Care Instructions. \"     If you do not have an account, please click on the \"Sign Up Now\" link. Current as of: December 20, 2019               Content Version: 12.6  © 6663-2663 ebridge, Incorporated. Care instructions adapted under license by [facility] ([facility]). If you have questions about a medical condition or this instruction, always ask your healthcare professional. [address] any warranty or liability for your use of this information. Patient Education        Stopping Smoking: Care Instructions  Your Care Instructions     Cigarette smokers crave the nicotine in cigarettes. Giving it up is much harder than simply changing a habit. Your body has to stop craving the nicotine. It is hard to quit, but you can do it. There are many tools that people use to quit smoking. You may find that combining tools works best for you. There are several steps to quitting. First you get ready to quit.  Then you get support to help you. After that, you learn new skills and behaviors to become a nonsmoker. For many people, a necessary step is getting and using medicine. Your doctor will help you set up the plan that best meets your needs. You may want to attend a smoking cessation program to help you quit smoking. When you choose a program, look for one that has proven success. Ask your doctor for ideas. You will greatly increase your chances of success if you take medicine as well as get counseling or join a cessation program.  Some of the changes you feel when you first quit tobacco are uncomfortable. Your body will miss the nicotine at first, and you may feel short-tempered and grumpy. You may have trouble sleeping or concentrating. Medicine can help you deal with these symptoms. You may struggle with changing your smoking habits and rituals. The last step is the tricky one: Be prepared for the smoking urge to continue for a time. This is a lot to deal with, but keep at it. You will feel better. Follow-up care is a key part of your treatment and safety. Be sure to make and go to all appointments, and call your doctor if you are having problems. It's also a good idea to know your test results and keep a list of the medicines you take. How can you care for yourself at home? · Ask your family, friends, and coworkers for support. You have a better chance of quitting if you have help and support. · Join a support group, such as Nicotine Anonymous, for people who are trying to quit smoking. · Consider signing up for a smoking cessation program, such as the American Lung Association's Freedom from Smoking program.  · Get text messaging support. Go to the website at www.smokefree. gov to sign up for the [facility] program.  · Set a quit date. Pick your date carefully so that it is not right in the middle of a big deadline or stressful time. Once you quit, do not even take a puff.  Get rid of all ashtrays and lighters after your last cigarette. Clean your house and your clothes so that they do not smell of smoke. · Learn how to be a nonsmoker. Think about ways you can avoid those things that make you reach for a cigarette. ? Avoid situations that put you at greatest risk for smoking. For some people, it is hard to have a drink with friends without smoking. For others, they might skip a coffee break with coworkers who smoke. ? Change your daily routine. Take a different route to work or eat a meal in a different place. · Cut down on stress. Calm yourself or release tension by doing an activity you enjoy, such as reading a book, taking a hot bath, or gardening. · Talk to your doctor or pharmacist about nicotine replacement therapy, which replaces the nicotine in your body. You still get nicotine but you do not use tobacco. Nicotine replacement products help you slowly reduce the amount of nicotine you need. These products come in several forms, many of them available over-the-counter:  ? Nicotine patches  ? Nicotine gum and lozenges  ? Nicotine inhaler  · Ask your doctor about bupropion (Wellbutrin) or varenicline (Chantix), which are prescription medicines. They do not contain nicotine. They help you by reducing withdrawal symptoms, such as stress and anxiety. · Some people find hypnosis, acupuncture, and massage helpful for ending the smoking habit. · Eat a healthy diet and get regular exercise. Having healthy habits will help your body move past its craving for nicotine. · Be prepared to keep trying. Most people are not successful the first few times they try to quit. Do not get mad at yourself if you smoke again. Make a list of things you learned and think about when you want to try again, such as next week, next month, or next year. Where can you learn more? Go to https://radha.Skytap. org and sign in to your LiveStub account.  Enter U054 in the Cloudmach box to learn more about \"Stopping Smoking: Care Instructions. \"     If you do not have an account, please click on the \"Sign Up Now\" link. Current as of: March 12, 2020               Content Version: 12.6  © 1082-8293 [employer], Incorporated. Care instructions adapted under license by [facility] ([facility]). If you have questions about a medical condition or this instruction, always ask your healthcare professional. [address] any warranty or liability for your use of this information.

## 2021-02-04 NOTE — PROGRESS NOTES
Date: 2/5/2021                                               Subjective/Objective:     Chief Complaint   Patient presents with    Follow-up       HPI     Giselle Garrett is a 24 yo female, here for physical. Last OV 1/5. Was treated with Azithro/Ceftriaxone for gonorrhea exposure at last OV. UPT was negative. Testing + gonorrhea and BV, was treated for BV with flagyl. Reports she completed all medications and symptoms had resolved. Pap with ASCUS, positive HPV. She was given referral to gynecology. Reports she is now having thin white discharge, began a few days ago. Feels sour, not particular burning or itching. Feels that this discharge is different than it was prior. LMP 12/24/2020, had some spotting 1/23/2021 but did not have her usual period. Had negative home UPT. Is sexually active, no current BCM. Reports last 2 days she has been nauseous. Was worse last night, she had HA became nauseous. Has not vomited. Having abdominal cramping. Yesterday she had what she describes as severe cramp on her right side, this resolved. Denies fever/chills. Denies diarrhea. Prediabetes: A1C 5.7 (7/2019). Diet-recently has been drinking soda, does eat breads/pastas. Doesn't eat a lot of sweets. Exercise-no regular. Tobacco abuse[de-identified] smokes 1/2 PPD for 5 years. Has some desire to quit, no plan to quit. Not ready to quit yet. Age/Gender Health Maintenance     Colon Cancer Screening - Denies      Tetanus - UTD  Influenza Vaccine - declines      HIV Screening - negative 10/2019  Hep C Screening- needs     Breast Cancer Screening - Denies . Does not perform monthly BSE  Cervical Cancer Screening - pap 1/2021 with ASCUS, + HPV.  Has referral to gyn           Patient Active Problem List    Diagnosis Date Noted    Hair loss 07/15/2019    Tobacco abuse 07/15/2019    Gastroesophageal reflux disease without esophagitis 07/15/2019    Mild intermittent asthma without complication 47/60/3372    Bilateral impacted cerumen 07/15/2019    Watery eyes 07/15/2019    Depression 07/15/2019    Acute appendicitis        Past Medical History:   Diagnosis Date    Asthma        Past Surgical History:   Procedure Laterality Date    APPENDECTOMY      LAPAROSCOPIC APPENDECTOMY  06/25/2017    APPENDECTOMY LAPAROSCOPIC       TONSILLECTOMY         Office Visit on 01/05/2021   Component Date Value Ref Range Status    Preg Test, Ur 01/05/2021 negative   Final    Trichomonas Vaginalis DNA 01/05/2021    Final                    Value:Negative DNA not detected. Normal range: Negative DNA not detected.  GARDNERELLA VAGINALIS, DNA PROBE 01/05/2021 *  Final                    Value:POSITIVE DNA Probe detected. Normal range: Negative DNA not detected.  KENJI SPECIES, DNA PROBE 01/05/2021    Final                    Value:Negative DNA not detected. Normal range: Negative DNA not detected.  Glucose, UA POC 01/05/2021 negative   Final    Bilirubin, UA 01/05/2021 negative   Final    Ketones, UA 01/05/2021 negative   Final    Spec Grav, UA 01/05/2021 1.030   Final    Blood, UA POC 01/05/2021 negative   Final    pH, UA 01/05/2021 6.5   Final    Protein, UA POC 01/05/2021 negative   Final    Urobilinogen, UA 01/05/2021 0.2   Final    Leukocytes, UA 01/05/2021 trace   Final    Nitrite, UA 01/05/2021 negative   Final    C. trachomatis DNA,Thin Prep 01/05/2021 Negative  Negative Final    N. gonorrhoeae DNA, Thin Prep 01/05/2021 POSITIVE* Negative Final    HPV TYPE 16 01/05/2021 Not Detected  Not Detected Final    HPV TYPE 18 01/05/2021 Not Detected  Not Detected Final    HPVOH (OTHER TYPES) 01/05/2021 DETECTED* Not Detected Final    *Includes 85,70,56,51,14,50,80,86,84,02,87,38 genotypes    HPV Comment 01/05/2021 See below   Final    Comment: **This is information only. See above for results. **    HPV other genotypes: 09,76,98,88,25,78,86,98,74,43,77,84    The Roche Zoya HPV Test is a qualitative in-vitro test for  the detection of Human Papillomavirus that provides specific  genotyping information for HPV Types 16 and 18, while  concurrently detecting 12 other high-risk HPV types 31,33,35,  44,14,81,21,32,11,21,58,01 in a pooled result. The test  utilizes amplification of target DNA by Polymerase Chain  Reaction (PCR) and nucleic acid hybridization. .         Family History   Problem Relation Age of Onset    Bipolar Disorder Father     Schizophrenia Father     Hypertension Paternal Grandmother     Diabetes Paternal Grandmother     Anxiety Disorder Paternal Grandmother     Arrhythmia Paternal Grandmother     Bipolar Disorder Brother     Ovarian Cancer Maternal Cousin        No current outpatient medications on file. No current facility-administered medications for this visit. Allergies   Allergen Reactions    No Known Allergies        Review of Systems   Constitutional: Negative for chills and fever. Respiratory: Negative for cough and shortness of breath. Gastrointestinal: Positive for abdominal pain and nausea. Negative for constipation, diarrhea and vomiting. Cramping   Endocrine: Negative for polydipsia, polyphagia and polyuria. Genitourinary: Positive for vaginal discharge. Negative for difficulty urinating. Neurological: Negative for dizziness and headaches. Psychiatric/Behavioral: Negative for sleep disturbance. All other systems reviewed and are negative. Vitals:  BP (!) 127/98   Pulse 89   Temp 96.8 °F (36 °C) (Temporal)   Ht 5' 3\" (1.6 m)   Wt 191 lb (86.6 kg)   BMI 33.83 kg/m²     Physical Exam  Exam conducted with a chaperone present Dominique Briseno MA present ). Constitutional:       General: She is not in acute distress. Appearance: Normal appearance. She is not ill-appearing. HENT:      Head: Normocephalic and atraumatic.       Right Ear: External ear normal.      Left Ear: External ear normal.   Eyes:      Pupils: Pupils are equal, round, and reactive to light.   Neck:      Musculoskeletal: Normal range of motion. Cardiovascular:      Rate and Rhythm: Normal rate and regular rhythm. Pulses: Normal pulses. Heart sounds: Normal heart sounds. Pulmonary:      Effort: Pulmonary effort is normal. No respiratory distress. Breath sounds: Normal breath sounds. Abdominal:      General: Abdomen is flat. There is no distension. Palpations: Abdomen is soft. Tenderness: There is no abdominal tenderness. Genitourinary:     Vagina: Vaginal discharge present. Cervix: No cervical motion tenderness, discharge, friability, erythema or cervical bleeding. Adnexa:         Right: No tenderness. Left: No tenderness. Comments: Scant clear vaginal discharge  Musculoskeletal: Normal range of motion. Skin:     General: Skin is warm and dry. Neurological:      General: No focal deficit present. Mental Status: She is alert and oriented to person, place, and time. Psychiatric:         Mood and Affect: Mood normal.         Behavior: Behavior normal.         Thought Content: Thought content normal.         Judgment: Judgment normal.         Assessment/Plan     1. Vaginal discharge: check GC/CT, affirm. Recently completed treatment for GC/CT and BV.   - C.trachomatis N.gonorrhoeae DNA, Urine; Future  - VAGINAL PATHOGENS PROBE *A    2. Abdominal cramping: UPT negative. Non tender on exam, low suspicion for ectopic, will obtain STAT TVUS, check HCG quant. ER precautions advised. - POCT urine pregnancy  - HCG, QUANTITATIVE, PREGNANCY; Future  - US NON OB TRANSVAGINAL; Future    3. Possible pregnancy: UPT negative. - POCT urine pregnancy  - HCG, QUANTITATIVE, PREGNANCY; Future    4. Prediabetes: advised diet, exercise. Check A1C  - Hemoglobin A1C; Future    5. Need for hepatitis C screening test  - HEPATITIS C ANTIBODY; Future    6. Preventative health care  - CBC WITH AUTO DIFFERENTIAL; Future  - TSH with Reflex;  Future  - COMPREHENSIVE METABOLIC PANEL; Future      Orders Placed This Encounter   Procedures    C.trachomatis N.gonorrhoeae DNA, Urine     Standing Status:   Future     Number of Occurrences:   1     Standing Expiration Date:   2/5/2022    US NON OB TRANSVAGINAL     This procedure can be scheduled via [employer]. Access your [employer] account by visiting [email or web address]. Standing Status:   Future     Number of Occurrences:   1     Standing Expiration Date:   2/5/2022     Order Specific Question:   Reason for exam:     Answer:   cramping, irregular periods, r/o ectopic    CBC WITH AUTO DIFFERENTIAL     Standing Status:   Future     Number of Occurrences:   1     Standing Expiration Date:   2/4/2022    TSH with Reflex     Standing Status:   Future     Number of Occurrences:   1     Standing Expiration Date:   2/4/2022    COMPREHENSIVE METABOLIC PANEL     Standing Status:   Future     Number of Occurrences:   1     Standing Expiration Date:   2/4/2022    HEPATITIS C ANTIBODY     Standing Status:   Future     Number of Occurrences:   1     Standing Expiration Date:   2/4/2022    Hemoglobin A1C     Standing Status:   Future     Number of Occurrences:   1     Standing Expiration Date:   2/4/2022    VAGINAL PATHOGENS PROBE *A    HCG, QUANTITATIVE, PREGNANCY     Standing Status:   Future     Number of Occurrences:   1     Standing Expiration Date:   2/5/2022    POCT urine pregnancy       Return if symptoms worsen or fail to improve. OR sooner with questions, concerns, worsening symptoms    YISEL ASHBY NP    2/5/2021  11:04 AM    Discussed use, benefit, and side effects of prescribed medications. Barriers to medication compliance addressed. Discussed all ordered testing and labs. All patient questions answered. Patient agreeable with plan above. Please note that this chart was generated using dragon dictation software.   Although every effort was made to ensure the accuracy of this automated transcription, some errors in transcription may have occurred.

## 2021-02-05 ENCOUNTER — OFFICE VISIT (OUTPATIENT)
Dept: PRIMARY CARE CLINIC | Age: 21
End: 2021-02-05
Payer: COMMERCIAL

## 2021-02-05 ENCOUNTER — HOSPITAL ENCOUNTER (OUTPATIENT)
Dept: ULTRASOUND IMAGING | Age: 21
Discharge: HOME | End: 2021-02-05
Payer: COMMERCIAL

## 2021-02-05 VITALS
BODY MASS INDEX: 33.84 KG/M2 | WEIGHT: 191 LBS | SYSTOLIC BLOOD PRESSURE: 127 MMHG | TEMPERATURE: 96.8 F | DIASTOLIC BLOOD PRESSURE: 98 MMHG | HEART RATE: 89 BPM | HEIGHT: 63 IN

## 2021-02-05 DIAGNOSIS — R10.9 ABDOMINAL CRAMPING: ICD-10-CM

## 2021-02-05 DIAGNOSIS — R73.03 PREDIABETES: ICD-10-CM

## 2021-02-05 DIAGNOSIS — N89.8 VAGINAL DISCHARGE: Primary | ICD-10-CM

## 2021-02-05 DIAGNOSIS — Z00.00 PREVENTATIVE HEALTH CARE: ICD-10-CM

## 2021-02-05 DIAGNOSIS — Z11.59 NEED FOR HEPATITIS C SCREENING TEST: ICD-10-CM

## 2021-02-05 DIAGNOSIS — N89.8 VAGINAL DISCHARGE: ICD-10-CM

## 2021-02-05 DIAGNOSIS — Z32.00 POSSIBLE PREGNANCY: ICD-10-CM

## 2021-02-05 LAB
A/G RATIO: 1.5 (ref 1.1–2.2)
ALBUMIN SERPL-MCNC: 4.3 G/DL (ref 3.4–5)
ALP BLD-CCNC: 70 U/L (ref 40–129)
ALT SERPL-CCNC: 11 U/L (ref 10–40)
ANION GAP SERPL CALCULATED.3IONS-SCNC: 11 MMOL/L (ref 3–16)
AST SERPL-CCNC: 16 U/L (ref 15–37)
BASOPHILS ABSOLUTE: 0.1 K/UL (ref 0–0.2)
BASOPHILS RELATIVE PERCENT: 1.2 %
BILIRUB SERPL-MCNC: <0.2 MG/DL (ref 0–1)
BUN BLDV-MCNC: 12 MG/DL (ref 7–20)
CALCIUM SERPL-MCNC: 9.4 MG/DL (ref 8.3–10.6)
CANDIDA SPECIES, DNA PROBE: ABNORMAL
CHLORIDE BLD-SCNC: 106 MMOL/L (ref 99–110)
CO2: 24 MMOL/L (ref 21–32)
CONTROL: NORMAL
CREAT SERPL-MCNC: 0.6 MG/DL (ref 0.6–1.1)
EOSINOPHILS ABSOLUTE: 0.1 K/UL (ref 0–0.6)
EOSINOPHILS RELATIVE PERCENT: 0.8 %
GARDNERELLA VAGINALIS, DNA PROBE: ABNORMAL
GFR AFRICAN AMERICAN: >60
GFR NON-AFRICAN AMERICAN: >60
GLOBULIN: 2.9 G/DL
GLUCOSE BLD-MCNC: 86 MG/DL (ref 70–99)
GONADOTROPIN, CHORIONIC (HCG) QUANT: <5 MIU/ML
HCT VFR BLD CALC: 43.3 % (ref 36–48)
HEMOGLOBIN: 14.2 G/DL (ref 12–16)
HEPATITIS C ANTIBODY INTERPRETATION: NORMAL
LYMPHOCYTES ABSOLUTE: 2.4 K/UL (ref 1–5.1)
LYMPHOCYTES RELATIVE PERCENT: 28.1 %
MCH RBC QN AUTO: 28.1 PG (ref 26–34)
MCHC RBC AUTO-ENTMCNC: 32.9 G/DL (ref 31–36)
MCV RBC AUTO: 85.3 FL (ref 80–100)
MONOCYTES ABSOLUTE: 0.8 K/UL (ref 0–1.3)
MONOCYTES RELATIVE PERCENT: 9.1 %
NEUTROPHILS ABSOLUTE: 5.3 K/UL (ref 1.7–7.7)
NEUTROPHILS RELATIVE PERCENT: 60.8 %
PDW BLD-RTO: 14.1 % (ref 12.4–15.4)
PLATELET # BLD: 171 K/UL (ref 135–450)
PMV BLD AUTO: 9.3 FL (ref 5–10.5)
POTASSIUM SERPL-SCNC: 3.9 MMOL/L (ref 3.5–5.1)
PREGNANCY TEST URINE, POC: NEGATIVE
RBC # BLD: 5.08 M/UL (ref 4–5.2)
SODIUM BLD-SCNC: 141 MMOL/L (ref 136–145)
TOTAL PROTEIN: 7.2 G/DL (ref 6.4–8.2)
TRICHOMONAS VAGINALIS DNA: ABNORMAL
TSH REFLEX: 1.64 UIU/ML (ref 0.27–4.2)
WBC # BLD: 8.7 K/UL (ref 4–11)

## 2021-02-05 PROCEDURE — G8484 FLU IMMUNIZE NO ADMIN: HCPCS | Performed by: NURSE PRACTITIONER

## 2021-02-05 PROCEDURE — 99214 OFFICE O/P EST MOD 30 MIN: CPT | Performed by: NURSE PRACTITIONER

## 2021-02-05 PROCEDURE — 4004F PT TOBACCO SCREEN RCVD TLK: CPT | Performed by: NURSE PRACTITIONER

## 2021-02-05 PROCEDURE — G8427 DOCREV CUR MEDS BY ELIG CLIN: HCPCS | Performed by: NURSE PRACTITIONER

## 2021-02-05 PROCEDURE — 81025 URINE PREGNANCY TEST: CPT | Performed by: NURSE PRACTITIONER

## 2021-02-05 PROCEDURE — 76830 TRANSVAGINAL US NON-OB: CPT

## 2021-02-05 PROCEDURE — G8417 CALC BMI ABV UP PARAM F/U: HCPCS | Performed by: NURSE PRACTITIONER

## 2021-02-05 PROCEDURE — 76856 US EXAM PELVIC COMPLETE: CPT

## 2021-02-05 SDOH — ECONOMIC STABILITY: FOOD INSECURITY: WITHIN THE PAST 12 MONTHS, THE FOOD YOU BOUGHT JUST DIDN'T LAST AND YOU DIDN'T HAVE MONEY TO GET MORE.: NOT ASKED

## 2021-02-05 SDOH — ECONOMIC STABILITY: INCOME INSECURITY: HOW HARD IS IT FOR YOU TO PAY FOR THE VERY BASICS LIKE FOOD, HOUSING, MEDICAL CARE, AND HEATING?: SOMEWHAT HARD

## 2021-02-05 ASSESSMENT — ENCOUNTER SYMPTOMS
COUGH: 0
SHORTNESS OF BREATH: 0
ABDOMINAL PAIN: 1
VOMITING: 0
CONSTIPATION: 0
DIARRHEA: 0
NAUSEA: 1

## 2021-02-06 LAB
ESTIMATED AVERAGE GLUCOSE: 111.2 MG/DL
HBA1C MFR BLD: 5.5 %

## 2021-02-08 DIAGNOSIS — B96.89 BACTERIAL VAGINOSIS: Primary | ICD-10-CM

## 2021-02-08 DIAGNOSIS — N76.0 BACTERIAL VAGINOSIS: Primary | ICD-10-CM

## 2021-02-08 LAB
C. TRACHOMATIS DNA ,URINE: NEGATIVE
N. GONORRHOEAE DNA, URINE: NEGATIVE

## 2021-02-08 RX ORDER — METRONIDAZOLE 500 MG/1
500 TABLET ORAL 2 TIMES DAILY
Qty: 14 TABLET | Refills: 0 | Status: SHIPPED | OUTPATIENT
Start: 2021-02-08 | End: 2021-02-15

## 2024-01-31 ENCOUNTER — HOSPITAL ENCOUNTER (EMERGENCY)
Age: 24
Discharge: HOME | End: 2024-01-31
Payer: COMMERCIAL

## 2024-01-31 VITALS
HEIGHT: 63 IN | BODY MASS INDEX: 32.42 KG/M2 | HEART RATE: 74 BPM | DIASTOLIC BLOOD PRESSURE: 97 MMHG | SYSTOLIC BLOOD PRESSURE: 141 MMHG | TEMPERATURE: 98.1 F | RESPIRATION RATE: 18 BRPM | WEIGHT: 182.98 LBS | OXYGEN SATURATION: 98 %

## 2024-01-31 DIAGNOSIS — S05.01XA ABRASION OF RIGHT CORNEA, INITIAL ENCOUNTER: Primary | ICD-10-CM

## 2024-01-31 DIAGNOSIS — T26.51XA: ICD-10-CM

## 2024-01-31 PROCEDURE — 99283 EMERGENCY DEPT VISIT LOW MDM: CPT

## 2024-01-31 PROCEDURE — 2500000003 HC RX 250 WO HCPCS: Performed by: PHYSICIAN ASSISTANT

## 2024-01-31 RX ORDER — ERYTHROMYCIN 5 MG/G
OINTMENT OPHTHALMIC
Qty: 1 EACH | Refills: 0 | Status: SHIPPED | OUTPATIENT
Start: 2024-01-31 | End: 2024-02-10

## 2024-01-31 RX ORDER — DICLOFENAC SODIUM 1 MG/ML
1 SOLUTION/ DROPS OPHTHALMIC 4 TIMES DAILY PRN
Qty: 1 EACH | Refills: 0 | Status: SHIPPED | OUTPATIENT
Start: 2024-01-31 | End: 2024-02-07

## 2024-01-31 RX ORDER — PROPARACAINE HYDROCHLORIDE 5 MG/ML
2 SOLUTION/ DROPS OPHTHALMIC ONCE
Status: COMPLETED | OUTPATIENT
Start: 2024-01-31 | End: 2024-01-31

## 2024-01-31 RX ADMIN — PROPARACAINE HYDROCHLORIDE 2 DROP: 5 SOLUTION/ DROPS OPHTHALMIC at 13:00

## 2024-01-31 ASSESSMENT — LIFESTYLE VARIABLES
HOW MANY STANDARD DRINKS CONTAINING ALCOHOL DO YOU HAVE ON A TYPICAL DAY: PATIENT DOES NOT DRINK
HOW OFTEN DO YOU HAVE A DRINK CONTAINING ALCOHOL: NEVER

## 2024-01-31 ASSESSMENT — VISUAL ACUITY: OU: 1

## 2024-01-31 ASSESSMENT — PAIN SCALES - GENERAL
PAINLEVEL_OUTOF10: 0
PAINLEVEL_OUTOF10: 2

## 2024-01-31 ASSESSMENT — PAIN - FUNCTIONAL ASSESSMENT: PAIN_FUNCTIONAL_ASSESSMENT: NONE - DENIES PAIN

## 2024-01-31 NOTE — DISCHARGE INSTRUCTIONS
Use medications as written for the right eye, cold compresses, and general eye rest while monitoring for gradual improvement over the next 24 to 48 hours.  Call [facility] on the number given if not significantly proved over the next 48 hours for further care and treatment. Return to the ER for any emergency worsening or concern.

## 2024-01-31 NOTE — ED PROVIDER NOTES
**ADVANCED PRACTICE PROVIDER, I HAVE EVALUATED THIS PATIENT**        [facility] EMERGENCY DEPARTMENT  EMERGENCY DEPARTMENT ENCOUNTER      Pt Name: Dick Marie  MRN:[id number]  Birthdate 2000  Date of evaluation: 1/31/2024  Provider: Redd Cruz PA-C  Note Started: 1:20 PM EST 1/31/24        Chief Complaint:    Chief Complaint   Patient presents with    Foreign Body in Eye     Pt got nail glue in eye today. Was swollen shut but then warm cloth was able to get eye open         Nursing Notes, Past Medical Hx, Past Surgical Hx, Social Hx, Allergies, and Family Hx were all reviewed and agreed with or any disagreements were addressed in the HPI.    HPI: (Location, Duration, Timing, Severity, Quality, Assoc Sx, Context, Modifying factors)    History From: Patient          Chief Complaint of nail glue into the right eye at trade school training earlier today    This is a  23 y.o. 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  She was using some nail glue and when she opened it it squirted onto the right eyelid and eye area.  She states that she called her teacher and started washing the area out right away.  Also was able to get the eyelids back open again.  Still says that she has some tenderness in the right eye, maybe a 2 or 3 out of 10, worse with blinking and better at rest.  It does not radiate.  No acute vision loss.  No other acute complaint.  This was in the last couple of hours according to patient.    PastMedical/Surgical History:      Diagnosis Date    Asthma          Procedure Laterality Date    APPENDECTOMY      LAPAROSCOPIC APPENDECTOMY  06/25/2017    APPENDECTOMY LAPAROSCOPIC       TONSILLECTOMY         Medications:  Previous Medications    No medications on file       Review of Systems:  (1 systems needed)  Review of Systems  History as above, no dizziness confusion syncope or near syncope headache or vision change shortness of breath or chest

## 2024-03-09 ENCOUNTER — HOSPITAL ENCOUNTER (EMERGENCY)
Age: 24
Discharge: LEFT BEFORE DISCHARGE | End: 2024-03-09
Payer: COMMERCIAL

## 2024-03-09 VITALS
RESPIRATION RATE: 18 BRPM | BODY MASS INDEX: 33.59 KG/M2 | WEIGHT: 189.6 LBS | OXYGEN SATURATION: 99 % | DIASTOLIC BLOOD PRESSURE: 84 MMHG | TEMPERATURE: 98.8 F | HEART RATE: 71 BPM | SYSTOLIC BLOOD PRESSURE: 145 MMHG

## 2024-03-09 DIAGNOSIS — O20.0 THREATENED ABORTION, ANTEPARTUM: Primary | ICD-10-CM

## 2024-03-09 DIAGNOSIS — Z53.21 ELOPED FROM EMERGENCY DEPARTMENT: ICD-10-CM

## 2024-03-09 PROCEDURE — 99281 EMR DPT VST MAYX REQ PHY/QHP: CPT

## 2024-03-09 ASSESSMENT — LIFESTYLE VARIABLES: HOW OFTEN DO YOU HAVE A DRINK CONTAINING ALCOHOL: NEVER

## 2024-03-09 ASSESSMENT — PAIN SCALES - GENERAL: PAINLEVEL_OUTOF10: 0

## 2024-03-09 NOTE — ED PROVIDER NOTES
[facility] EMERGENCY DEPARTMENT  EMERGENCY DEPARTMENT ENCOUNTER        Pt Name: Dick Marie  MRN: [id number]  Birthdate 2000  Date of evaluation: 3/9/2024  Provider: ERIC Roca - CNP  PCP: Bekah Barboza APRN  Note Started: 2:34 PM EST 3/9/24      VERENICE. I have evaluated this patient.        CHIEF COMPLAINT       Chief Complaint   Patient presents with    Vaginal Bleeding     Patient is 10 weeks pregnant was showering and noticed bright red blood to \"pour out.\" Does not know if she is still bleeding, no pain/cramping. VSS.        HISTORY OF PRESENT ILLNESS: 1 or more Elements     History From: Patient, mother at the bedside        Social Determinants Significantly Affecting Health : Patient has significant healthcare illiteracy    Chief Complaint: Above    Dick Marie is a 24 y.o. female who presents to ED with concern for vaginal bleeding this morning.  1 episode.  Roughly 11 weeks pregnant.  Sees prenatal at .   with 1 prior miscarriage.    The patient  reports that she has quit smoking. Her smoking use included cigarettes. She has never used smokeless tobacco. She reports that she does not currently use drugs after having used the following drugs: Marijuana (Weed). Frequency: 2.00 times per week. She reports that she does not drink alcohol.     Nursing Notes were all reviewed and agreed with or any disagreements were addressed in the HPI.    REVIEW OF SYSTEMS :      Review of Systems    Positives and Pertinent negatives as per HPI.     SURGICAL HISTORY     Past Surgical History:   Procedure Laterality Date    APPENDECTOMY      LAPAROSCOPIC APPENDECTOMY  2017    APPENDECTOMY LAPAROSCOPIC       TONSILLECTOMY         CURRENTMEDICATIONS       There are no discharge medications for this patient.      ALLERGIES     No known allergies    FAMILYHISTORY       Family History   Problem Relation Age of Onset    Bipolar Disorder Father     Schizophrenia Father      discharge medications for this patient.             (Please note that portions of this note were completed with a voice recognition program.  Efforts were made to edit the dictations but occasionally words are mis-transcribed.)    ERIC Roca CNP (electronically signed)       Den Camacho APRN - CNP  03/09/24 1925

## 2024-11-19 NOTE — ED TRIAGE NOTES
Patient is 10 weeks pregnant was showering and noticed bright red blood to \"pour out.\" Does not know if she is still bleeding, no pain/cramping. VSS.   
No